# Patient Record
Sex: FEMALE | Race: WHITE
[De-identification: names, ages, dates, MRNs, and addresses within clinical notes are randomized per-mention and may not be internally consistent; named-entity substitution may affect disease eponyms.]

---

## 2019-06-12 ENCOUNTER — HOSPITAL ENCOUNTER (EMERGENCY)
Dept: HOSPITAL 10 - FTE | Age: 19
Discharge: HOME | End: 2019-06-12
Payer: COMMERCIAL

## 2019-06-12 ENCOUNTER — HOSPITAL ENCOUNTER (EMERGENCY)
Dept: HOSPITAL 91 - FTE | Age: 19
Discharge: HOME | End: 2019-06-12
Payer: COMMERCIAL

## 2019-06-12 VITALS
HEIGHT: 60 IN | HEIGHT: 60 IN | WEIGHT: 131.62 LBS | WEIGHT: 131.62 LBS | BODY MASS INDEX: 25.84 KG/M2 | BODY MASS INDEX: 25.84 KG/M2

## 2019-06-12 VITALS — RESPIRATION RATE: 18 BRPM | HEART RATE: 69 BPM | SYSTOLIC BLOOD PRESSURE: 120 MMHG | DIASTOLIC BLOOD PRESSURE: 68 MMHG

## 2019-06-12 DIAGNOSIS — L20.82: Primary | ICD-10-CM

## 2019-06-12 PROCEDURE — 99282 EMERGENCY DEPT VISIT SF MDM: CPT

## 2019-06-12 NOTE — ERD
ER Documentation


Chief Complaint


Chief Complaint





RASH/ BUMPS ON FINGERS X'S 1 DAY





HPI


Patient is a 18 years old female with PMHx of Eczema presenting to the hospital 


for multiple small bumps on her fingers since today morning. Patient reports 


bumps came about suddenly, are non-painful, non-pruritic, and non-tender. 


Patient admits to taking otc Benadryl without resolution. Patient is also 


reporting of her Ezcema flare up and has run out of her medication. Patient 


denies/cannot recall any previous event prior to onset of bumps on her fingers.





ROS


All systems reviewed and are negative except as per history of present illness.





Medications


Home Meds


Active Scripts


Hydrocortisone* Topical (Hydrocortisone* Topical) 2.5%-28.3 Gm Cream..g., 1 


APPLIC TOP BID, #1 TUB


   Prov:JAYLEEN HOLLOWAY PA-C         6/12/19


Ibuprofen* (Motrin*) 400 Mg Tab, 400 MG PO Q6, #30 TAB


   Prov:COURTNEY BOYLE         9/6/15


Reported Medications


[None]   No Conflict Check


   9/20/10





Allergies


Allergies:  


Coded Allergies:  


     No Known Allergies (Verified  Allergy, Mild, 2/20/13)





PMhx/Soc


History of Surgery:  No


Anesthesia Reaction:  No


Hx Neurological Disorder:  No


Hx Respiratory Disorders:  No


Hx Cardiac Disorders:  No


Hx Psychiatric Problems:  No


Hx Miscellaneous Medical Probl:  No


Hx Alcohol Use:  No


Hx Substance Use:  No


Hx Tobacco Use:  No





Physical Exam


Vitals





Vital Signs


  Date      Temp  Pulse  Resp  B/P (MAP)   Pulse Ox  O2          O2 Flow    FiO2


Time                                                 Delivery    Rate


   6/12/19  97.6     69    18      120/68       100


     19:51                           (85)





Physical Exam


Const:   No acute distress


Head:   Atraumatic 


Eyes:    Normal Conjunctiva


Neck:               Full range of motion. No meningismus.


Resp:   Clear to auscultation bilaterally


Cardio:   Regular rate and rhythm, no murmurs


Abd:    Soft, non tender, non distended. Normal bowel sounds


Skin:   Multiple small non-tender raised lumps on bilateral upper extremity 


digits without any erythema without blisters. Severe eczematous skin rash across


 bilateral forearm, biceps, neck, and face. No blisters, no induration, no 


excoriation noted.


Neur:   Awake and alert


Psych:    Normal Mood and Affect





Procedures/MDM


Patient was seen and evaluated for skin rash. Patient is currently experiencing 


an eczematous skin eruption with digital heat rash vs exanthem. No signs of 


cellulitis, bacterial, fungal infection noted on exam. Patient is stable and 


ready for discharge. Patient was advised to f/u with dermatologist.





Steroids was withheld as patient was advised about side effects of excessive PO 


steroids.





Departure


Diagnosis:  


   Primary Impression:  


   Eczema


   Eczema type:  flexural  Qualified Codes:  L20.82 - Flexural eczema


Condition:  Stable


Patient Instructions:  Atopic Dermatitis (Eczema)


Referrals:  


[facility]





Additional Instructions:  


Patient advised to return to the ED immediately for new or worsening symptoms. 


Patient advised to follow up with primary care provider in the next 24-48 hours.


 Patient verbalized understanding and agrees with treatment plan and course of 


action.














If patient has no primary care they may follow up with














[facility]





[address] [place] [id number]














or














[facility]





[address] [place] [address]














or














[facility]





[address] [place] [address]











JAYLEEN HOLLOWAY PA-C               Jun 12, 2019 21:06